# Patient Record
Sex: FEMALE | Race: WHITE | NOT HISPANIC OR LATINO | Employment: OTHER | URBAN - METROPOLITAN AREA
[De-identification: names, ages, dates, MRNs, and addresses within clinical notes are randomized per-mention and may not be internally consistent; named-entity substitution may affect disease eponyms.]

---

## 2018-06-21 ENCOUNTER — OFFICE VISIT (OUTPATIENT)
Dept: URGENT CARE | Facility: URGENT CARE | Age: 78
End: 2018-06-21
Payer: COMMERCIAL

## 2018-06-21 VITALS
SYSTOLIC BLOOD PRESSURE: 131 MMHG | HEART RATE: 57 BPM | TEMPERATURE: 99 F | DIASTOLIC BLOOD PRESSURE: 73 MMHG | WEIGHT: 175 LBS | OXYGEN SATURATION: 97 %

## 2018-06-21 DIAGNOSIS — H11.31 SUBCONJUNCTIVAL HEMORRHAGE OF RIGHT EYE: Primary | ICD-10-CM

## 2018-06-21 PROCEDURE — 99203 OFFICE O/P NEW LOW 30 MIN: CPT | Performed by: FAMILY MEDICINE

## 2018-06-21 NOTE — MR AVS SNAPSHOT
After Visit Summary   6/21/2018    Marcell Malone    MRN: [id number]           Patient Information     Date Of Birth          1940        Visit Information        Provider Department      6/21/2018 7:20 PM Pricila Angel MD [facility]        Today's Diagnoses     Subconjunctival hemorrhage of right eye    -  1      Care Instructions      Subconjunctival Hemorrhage    A subconjunctival hemorrhage is when a blood vessel breaks open in the white of the eye. It causes a bright red patch in the white of the eye. It is similar to a bruise on the skin. This type of hemorrhage is common. It can look quite alarming, but it is usually harmless.  Understanding the conjunctiva  The conjunctiva is the thin layer that covers the inside of the eyelids and the surface of the eye. It has many tiny blood vessels that bring oxygen and nutrients to the eye. The sclera is the white part of the eye that lies beneath the conjunctiva. Sometimes a blood vessel in the conjunctiva breaks open and bleeds. The blood then collects under the conjunctiva and turns part of the eye red. Over several weeks, your body then absorbs the blood.  What causes subconjunctival hemorrhage?  In many cases the cause isn t known. But some health conditions may make it more likely. These include:    Eye injury    Eye surgery    High blood pressure    Inflammation of the conjunctiva    Contact lens use    Diabetes    Arteriosclerosis    Tumor of the conjunctiva    Diseases that affect blood clotting    Violent sneezing, coughing, or vomiting    Certain medicines that can increase bleeding, such as aspirin    Pushing hard during childbirth    Straining during constipation  Symptoms of subconjunctival hemorrhage  The main symptom is a red patch on the eye. You may notice it after waking up in the morning. In most cases just one eye will have a hemorrhage. It usually happens once and then goes away. But some  health conditions may cause repeat hemorrhages. You may feel like you have something in your eye, but this is not common. The hemorrhage shouldn t affect your eyesight or cause any pain. If you do have pain, you may have another type of problem with your eye.  Diagnosing subconjunctival hemorrhage  Your healthcare provider will ask about your health history. You may have a physical exam. This includes a basic eye exam. Your provider will make sure you don t have other causes of red eye that may need other treatment.  You will need to see an eye doctor (ophthalmologist) if you have had an eye injury. This doctor might use a special lighted microscope to look closely at your eye. This helps show the doctor if the injury hurt the eye itself and not just its outer layer.  If this is not your first subconjunctival hemorrhage, your doctor may need to find the cause. For example, you may need blood tests to check for a blood clotting disorder.  Treatment for subconjunctival hemorrhage  In most cases you will not need treatment. The red patch will usually go away on its own in a few weeks. It will turn from red to brown and then yellow. There are no treatments to speed up this process. Your doctor may suggest you use a warm compress and artificial tears eye drops to help relieve some of the redness.  If your subconjunctival hemorrhage was caused by a health condition, that condition will be treated. For example, you may need a blood pressure medicine to treat high blood pressure.  When to call your healthcare provider  Call your healthcare provider right away if you have any of these:    Hemorrhage that doesn t go away in 2 to 3 weeks    Eye pain    Loss of eyesight    Another subconjunctival hemorrhage    Date Last Reviewed: 2/1/2017 2000-2017 [employer]. [address] [place] [address], [place] [address]. All rights reserved. This information is not intended as a substitute for professional medical care. Always  follow your healthcare professional's instructions.                Follow-ups after your visit        Follow-up notes from your care team     Return if symptoms worsen or fail to improve.      Who to contact     If you have questions or need follow up information about today's clinic visit or your schedule please contact [facility] directly at [phone].  Normal or non-critical lab and imaging results will be communicated to you by MyChart, letter or phone within 4 business days after the clinic has received the results. If you do not hear from us within 7 days, please contact the clinic through MyChart or phone. If you have a critical or abnormal lab result, we will notify you by phone as soon as possible.  Submit refill requests through [employer] or call your pharmacy and they will forward the refill request to us. Please allow 3 business days for your refill to be completed.          Additional Information About Your Visit        Care EveryWhere ID     This is your Care EveryWhere ID. This could be used by other organizations to access your [place] medical records  [id number]        Your Vitals Were     Pulse Temperature Pulse Oximetry             57 99  F (37.2  C) (Tympanic) 97%          Blood Pressure from Last 3 Encounters:   06/21/18 131/73    Weight from Last 3 Encounters:   06/21/18 175 lb (79.4 kg)              Today, you had the following     No orders found for display       Primary Care Provider Fax #    Provider Not In System [phone]                Equal Access to Services     SANFORD GOTTI : Hadii scotty ku hadasho Soomaali, waaxda luqadaha, qaybta kaalmada adejay jayda, дмитрий tenorio. So [facility] [phone].    ATENCIÓN: Si habla español, tiene a guzámn disposición servicios gratuitos de asistencia lingüística. Elroy chavez [phone].    We comply with applicable federal civil rights laws and [place] laws. We do not discriminate on the basis of race,  color, national origin, age, disability, sex, sexual orientation, or gender identity.            Thank you!     Thank you for choosing [facility]  for your care. Our goal is always to provide you with excellent care. Hearing back from our patients is one way we can continue to improve our services. Please take a few minutes to complete the written survey that you may receive in the mail after your visit with us. Thank you!             Your Updated Medication List - Protect others around you: Learn how to safely use, store and throw away your medicines at www.disposemymeds.org.          This list is accurate as of 6/21/18  7:54 PM.  Always use your most recent med list.                   Brand Name Dispense Instructions for use Diagnosis    BENADRYL PO

## 2018-06-22 NOTE — PROGRESS NOTES
SUBJECTIVE:   78 year old female with bloodshot right eye noticed about 2.5 hours ago.  Normal vision.  Feels right eye stinging sensation.  No other symptoms.  No significant prior ophthalmological history. No change in visual acuity, no photophobia, no severe eye pain.  She has been lifting a few things at home.  Denies eye trauma.  Does not take ASA nor Ibuprofen.     OBJECTIVE:  /73  Pulse 57  Temp 99  F (37.2  C) (Tympanic)  Wt 175 lb (79.4 kg)  SpO2 97%   Patient appears well, vitals signs are normal. Eyes: right eye with findings of typical subconjunctival hemorrhage from 3 to 9 o'clock.  No discharge. PERRL, EOMI, no foreign body noted. No periorbital cellulitis. The corneas are clear.      ASSESSMENT:   Right subconjunctival hemorrhage    PLAN:   Reassured.  Avoid ASA and Ibuprofen for now.  Avoid heavy lifting.  RTC if not improving as anticipated.     Pricila Gomez MD

## 2018-06-22 NOTE — PATIENT INSTRUCTIONS
Subconjunctival Hemorrhage    A subconjunctival hemorrhage is when a blood vessel breaks open in the white of the eye. It causes a bright red patch in the white of the eye. It is similar to a bruise on the skin. This type of hemorrhage is common. It can look quite alarming, but it is usually harmless.  Understanding the conjunctiva  The conjunctiva is the thin layer that covers the inside of the eyelids and the surface of the eye. It has many tiny blood vessels that bring oxygen and nutrients to the eye. The sclera is the white part of the eye that lies beneath the conjunctiva. Sometimes a blood vessel in the conjunctiva breaks open and bleeds. The blood then collects under the conjunctiva and turns part of the eye red. Over several weeks, your body then absorbs the blood.  What causes subconjunctival hemorrhage?  In many cases the cause isn t known. But some health conditions may make it more likely. These include:    Eye injury    Eye surgery    High blood pressure    Inflammation of the conjunctiva    Contact lens use    Diabetes    Arteriosclerosis    Tumor of the conjunctiva    Diseases that affect blood clotting    Violent sneezing, coughing, or vomiting    Certain medicines that can increase bleeding, such as aspirin    Pushing hard during childbirth    Straining during constipation  Symptoms of subconjunctival hemorrhage  The main symptom is a red patch on the eye. You may notice it after waking up in the morning. In most cases just one eye will have a hemorrhage. It usually happens once and then goes away. But some health conditions may cause repeat hemorrhages. You may feel like you have something in your eye, but this is not common. The hemorrhage shouldn t affect your eyesight or cause any pain. If you do have pain, you may have another type of problem with your eye.  Diagnosing subconjunctival hemorrhage  Your healthcare provider will ask about your health history. You may have a physical exam. This  includes a basic eye exam. Your provider will make sure you don t have other causes of red eye that may need other treatment.  You will need to see an eye doctor (ophthalmologist) if you have had an eye injury. This doctor might use a special lighted microscope to look closely at your eye. This helps show the doctor if the injury hurt the eye itself and not just its outer layer.  If this is not your first subconjunctival hemorrhage, your doctor may need to find the cause. For example, you may need blood tests to check for a blood clotting disorder.  Treatment for subconjunctival hemorrhage  In most cases you will not need treatment. The red patch will usually go away on its own in a few weeks. It will turn from red to brown and then yellow. There are no treatments to speed up this process. Your doctor may suggest you use a warm compress and artificial tears eye drops to help relieve some of the redness.  If your subconjunctival hemorrhage was caused by a health condition, that condition will be treated. For example, you may need a blood pressure medicine to treat high blood pressure.  When to call your healthcare provider  Call your healthcare provider right away if you have any of these:    Hemorrhage that doesn t go away in 2 to 3 weeks    Eye pain    Loss of eyesight    Another subconjunctival hemorrhage    Date Last Reviewed: 2/1/2017 2000-2017 [employer]. [address] [place] [address], [place] [address]. All rights reserved. This information is not intended as a substitute for professional medical care. Always follow your healthcare professional's instructions.

## 2019-04-23 ENCOUNTER — ANCILLARY PROCEDURE (OUTPATIENT)
Dept: GENERAL RADIOLOGY | Facility: CLINIC | Age: 79
End: 2019-04-23
Attending: FAMILY MEDICINE
Payer: MEDICARE

## 2019-04-23 ENCOUNTER — OFFICE VISIT (OUTPATIENT)
Dept: URGENT CARE | Facility: URGENT CARE | Age: 79
End: 2019-04-23
Payer: MEDICARE

## 2019-04-23 VITALS
OXYGEN SATURATION: 98 % | DIASTOLIC BLOOD PRESSURE: 68 MMHG | WEIGHT: 172 LBS | TEMPERATURE: 97.8 F | HEART RATE: 63 BPM | SYSTOLIC BLOOD PRESSURE: 135 MMHG

## 2019-04-23 DIAGNOSIS — S69.91XA HAND INJURY, RIGHT, INITIAL ENCOUNTER: ICD-10-CM

## 2019-04-23 DIAGNOSIS — S62.354A CLOSED NONDISPLACED FRACTURE OF SHAFT OF FOURTH METACARPAL BONE OF RIGHT HAND, INITIAL ENCOUNTER: Primary | ICD-10-CM

## 2019-04-23 DIAGNOSIS — S09.90XA HEAD INJURY, INITIAL ENCOUNTER: ICD-10-CM

## 2019-04-23 PROCEDURE — 73130 X-RAY EXAM OF HAND: CPT | Mod: RT | Performed by: FAMILY MEDICINE

## 2019-04-23 PROCEDURE — 99214 OFFICE O/P EST MOD 30 MIN: CPT | Performed by: FAMILY MEDICINE

## 2019-04-23 RX ORDER — FUROSEMIDE 20 MG
TABLET ORAL
Refills: 3 | COMMUNITY
Start: 2019-04-04

## 2019-04-24 NOTE — PROGRESS NOTES
SUBJECTIVE:   Marcell Malone is a 79 year old female presenting with multiple complaints related to a fall this afternoon.    Chief Complaint   Patient presents with     Urgent Care     Head Injury     c/o head injury     (1)  She tripped on an uneven sidewalk, landing on her outstretched right hand and hitting her left forehead.  No LOC, no confusions, dizziness, vision or speech difficulties, no c/o light sensitivity, no c/o headache or pain with eye movement.   Not on any blood thinners.  No bleeding from the site, but an abrasion is present along with some swelling at the left eyebrow.  No bruising.  She has not cleaned the wound or done anything for symptoms as of yet.    (2) right hand - bruising and swelling to the posterior hand after the fall.  Sore.  Hurts to move the fingers, but able to do so.  No numbness or tingling.         ROS:  5-Point Review of Systems Negative-- Except as stated above.    OBJECTIVE  /68   Pulse 63   Temp 97.8  F (36.6  C) (Oral)   Wt 78 kg (172 lb)   SpO2 98%   GENERAL:  Awake, alert and interactive. No acute distress.  HEAD:   PERRL, no discomfort with eye movement, EOMI, clear conjunctiva, eyelids appear benign.  Nose clear.  Mild puffiness to the middle - lateral left eyebrow and a vertical ~ 1 cm abrasion midline above the brow.  No erythema, no bruising.    HAND:  Right hand with arthritic and age related changes evident.  Bruising and swelling over the 3rd - 5th MCP joint.  Some tenderness to palpation this area also.   No TTP over fingers, rest of hand, or wrist.   Skin intact on hand.  No compromise to distal circulation.    XRAY HAND: shaft fracture to the proximal metacarpal, right hand, 4th digit.   Diffuse degenerative changes.      ASSESSMENT/PLAN    ICD-10-CM    1. Closed nondisplaced fracture of shaft of fourth metacarpal bone of right hand, initial encounter S62.354A    2. Head injury, initial encounter S09.90XA    3. Hand injury, right, initial encounter  S69.91XA XR Hand Right G/E 3 Views       We discussed the expected course healing, possibility of scarring on the forehead and s/s of head injury that would indicate a need for further evaluation right away, and symptomatic cares in detail.    Brace on the finger,  Tylenol or nsaids, f/u with PCP in ~ 1 week.      Patient Instructions   Tylenol or ibuprofen, ice packs for discomfort as needed for both the forehead and the hand.   Antibiotic ointment to the abrasion on your forehead until it scabs over, then leave open to air.       If any bad headaches, vomiting, speech difficulty, changes in sleep, or any of the other warning signs we discussed (and see the information on head injury below) develop -- return to care for further evaluation.     Wear the brace on your finger until your follow with your primary provider in about a week.  See your doctor right away if any new or worsening symptoms develop.           Patient Education     * Head Injury, no wake-up (Adult)    You have a head injury. It doesn t look serious right now. But symptoms of a more serious problem may appear later. This could be a mild brain injury (concussion), or bruising or bleeding in the brain. You or someone caring for you will need to watch for the symptoms listed below for at least the next 24 hours. When you get home, be sure to follow any care instructions you re given.  Home care  Watch for the following symptoms  Call 9-1-1 if you have any of these symptoms over the next hours or days:  1. Severe headache or headache that gets worse  2. Nausea and repeated throwing up (vomiting)  3. Dizziness or changes in eyesight (vision changes)  4. Bothered by bright light or loud noise  5. Sleep changes (trouble falling asleep or unusually sleepy or groggy)  6. Changes in the way you act or talk (personality or speech changes)  7. Feeling confused or forgetting things (memory loss)  8. Trouble walking or clumsiness  9. Passing out or fainting  (even for a short time)  10. Won t wake up  11. Stiff neck  12. Weakness or numbness in any part of the body  13. Seizures  Do you have signs of a concussion?  A concussion is an injury to the brain caused by shaking. If you were knocked out, that s a sign you may have a concussion. But watch for these signs, too:    Upset stomach (nausea)    Throwing up (vomiting)    Feeling dizzy or confused    Headache    Loss of memory  If you have any of the above signs:    Don t return to sports or any activity where you might hurt your head again.    Wait until all symptoms have been gone for 2 full weeks, and your doctor has cleared you to return to sports.  You could get a serious brain injury if you get hurt again before fully recovering.  General care    You don t need to stay awake or be woken during the night.    For pain, you may use:  ? Tylenol (acetaminophen) 650 to 1000 mg every 6 hours OR  ? Motrin or Advil (ibuprofen) 600 mg every 6 to 8 hours with food  NOTE: If you have chronic liver or kidney disease or ever had a stomach ulcer or GI bleeding, talk with your doctor before using these medicines.    Don t take aspirin after a head injury.    For swelling and to help with pain: Put a cold source to the injured area for up to 20 minutes at a time. Do this as often as directed. Use a cold pack or bag of ice wrapped in a thin towel. Never put a cold source directly on the skin.    For cuts and scrapes: Care for them as the doctor or nurse directed.    For the next 24 hours (or longer, if your doctor advises it):  ? Don t drink alcohol, use sedatives, or use other medicines that make you sleepy.  ? Don t drive or use large machines.  ? Don t do anything tiring, such as heavy lifting or straining.  ? Limit tasks where you need to focus or concentrate. This includes reading, using a smartphone or computer, watching TV and playing video games.  ? Don t return to sports or other activities that could cause another head  injury.  Follow-up care  Follow up with your doctor if symptoms don t get better after 24 hours, or as directed.  When to call the doctor  Call your doctor right away if any of these occur:    Pain doesn t get better or gets worse    New or increased swelling or bruising    Fever of 100.4 F (38 C) or higher, or as directed by your doctor    Increased redness, warmth, draining or bleeding from the injury    Fluid drainage or bleeding from the nose or ears    Any pushed-in spot or bony bump in the injured area  Date Last Reviewed: 9/26/2015 2000-2018 [employer]. [address] [place] [address]. All rights reserved. This information is not intended as a substitute for professional medical care. Always follow your healthcare professional's instructions.  This information has been modified by your health care provider with permission from the publisher.  Modifications clinically reviewed by Aston Mireles DO, MBA, LEXIS, Director of Physician Informatics for Emergency Medicine, [facility] on 8/27/18.

## 2019-04-24 NOTE — PATIENT INSTRUCTIONS
Tylenol or ibuprofen, ice packs for discomfort as needed for both the forehead and the hand.   Antibiotic ointment to the abrasion on your forehead until it scabs over, then leave open to air.       If any bad headaches, vomiting, speech difficulty, changes in sleep, or any of the other warning signs we discussed (and see the information on head injury below) develop -- return to care for further evaluation.     Wear the brace on your finger until your follow with your primary provider in about a week.  See your doctor right away if any new or worsening symptoms develop.           Patient Education     * Head Injury, no wake-up (Adult)    You have a head injury. It doesn t look serious right now. But symptoms of a more serious problem may appear later. This could be a mild brain injury (concussion), or bruising or bleeding in the brain. You or someone caring for you will need to watch for the symptoms listed below for at least the next 24 hours. When you get home, be sure to follow any care instructions you re given.  Home care  Watch for the following symptoms  Call 9-1-1 if you have any of these symptoms over the next hours or days:  1. Severe headache or headache that gets worse  2. Nausea and repeated throwing up (vomiting)  3. Dizziness or changes in eyesight (vision changes)  4. Bothered by bright light or loud noise  5. Sleep changes (trouble falling asleep or unusually sleepy or groggy)  6. Changes in the way you act or talk (personality or speech changes)  7. Feeling confused or forgetting things (memory loss)  8. Trouble walking or clumsiness  9. Passing out or fainting (even for a short time)  10. Won t wake up  11. Stiff neck  12. Weakness or numbness in any part of the body  13. Seizures  Do you have signs of a concussion?  A concussion is an injury to the brain caused by shaking. If you were knocked out, that s a sign you may have a concussion. But watch for these signs, too:    Upset stomach  (nausea)    Throwing up (vomiting)    Feeling dizzy or confused    Headache    Loss of memory  If you have any of the above signs:    Don t return to sports or any activity where you might hurt your head again.    Wait until all symptoms have been gone for 2 full weeks, and your doctor has cleared you to return to sports.  You could get a serious brain injury if you get hurt again before fully recovering.  General care    You don t need to stay awake or be woken during the night.    For pain, you may use:  ? Tylenol (acetaminophen) 650 to 1000 mg every 6 hours OR  ? Motrin or Advil (ibuprofen) 600 mg every 6 to 8 hours with food  NOTE: If you have chronic liver or kidney disease or ever had a stomach ulcer or GI bleeding, talk with your doctor before using these medicines.    Don t take aspirin after a head injury.    For swelling and to help with pain: Put a cold source to the injured area for up to 20 minutes at a time. Do this as often as directed. Use a cold pack or bag of ice wrapped in a thin towel. Never put a cold source directly on the skin.    For cuts and scrapes: Care for them as the doctor or nurse directed.    For the next 24 hours (or longer, if your doctor advises it):  ? Don t drink alcohol, use sedatives, or use other medicines that make you sleepy.  ? Don t drive or use large machines.  ? Don t do anything tiring, such as heavy lifting or straining.  ? Limit tasks where you need to focus or concentrate. This includes reading, using a smartphone or computer, watching TV and playing video games.  ? Don t return to sports or other activities that could cause another head injury.  Follow-up care  Follow up with your doctor if symptoms don t get better after 24 hours, or as directed.  When to call the doctor  Call your doctor right away if any of these occur:    Pain doesn t get better or gets worse    New or increased swelling or bruising    Fever of 100.4 F (38 C) or higher, or as directed by your  doctor    Increased redness, warmth, draining or bleeding from the injury    Fluid drainage or bleeding from the nose or ears    Any pushed-in spot or bony bump in the injured area  Date Last Reviewed: 9/26/2015 2000-2018 [employer] [address] [place] [address]. All rights reserved. This information is not intended as a substitute for professional medical care. Always follow your healthcare professional's instructions.  This information has been modified by your health care provider with permission from the publisher.  Modifications clinically reviewed by Aston Mireles DO, MBA, LEXIS, Director of Physician Informatics for Emergency Medicine, [facility] on 8/27/18.

## 2019-04-29 ENCOUNTER — OFFICE VISIT (OUTPATIENT)
Dept: FAMILY MEDICINE | Facility: CLINIC | Age: 79
End: 2019-04-29
Payer: MEDICARE

## 2019-04-29 VITALS
OXYGEN SATURATION: 98 % | DIASTOLIC BLOOD PRESSURE: 80 MMHG | HEIGHT: 65 IN | SYSTOLIC BLOOD PRESSURE: 130 MMHG | BODY MASS INDEX: 29.77 KG/M2 | RESPIRATION RATE: 16 BRPM | WEIGHT: 178.7 LBS | HEART RATE: 52 BPM | TEMPERATURE: 98.2 F

## 2019-04-29 DIAGNOSIS — S62.304D CLOSED NONDISPLACED FRACTURE OF FOURTH METACARPAL BONE OF RIGHT HAND WITH ROUTINE HEALING, UNSPECIFIED PORTION OF METACARPAL, SUBSEQUENT ENCOUNTER: ICD-10-CM

## 2019-04-29 DIAGNOSIS — Z01.818 PREOP GENERAL PHYSICAL EXAM: Primary | ICD-10-CM

## 2019-04-29 PROCEDURE — 36415 COLL VENOUS BLD VENIPUNCTURE: CPT | Performed by: PHYSICIAN ASSISTANT

## 2019-04-29 PROCEDURE — 80048 BASIC METABOLIC PNL TOTAL CA: CPT | Performed by: PHYSICIAN ASSISTANT

## 2019-04-29 PROCEDURE — 99214 OFFICE O/P EST MOD 30 MIN: CPT | Performed by: PHYSICIAN ASSISTANT

## 2019-04-29 ASSESSMENT — MIFFLIN-ST. JEOR: SCORE: 1272.49

## 2019-04-29 NOTE — PROGRESS NOTES
/[facility]  [address] [place]  [facility] [id number] [phone]  Dept: [phone]    PRE-OP EVALUATION:  Today's date: 2019    Marcell Malone (: 1940) presents for pre-operative evaluation assessment as requested by [facility].  She requires evaluation and anesthesia risk assessment prior to undergoing surgery/procedure for treatment of finger fracture .    Fax number for surgical facility: [phone]  Primary Physician: No Ref-Primary, Physician  Type of Anesthesia Anticipated: Local    Patient has a Health Care Directive or Living Will:  NO    Preop Questions 2019   Who is doing your surgery? [facility]   What are you having done? right hand ring finger   Date of Surgery/Procedure: 2019   Facility or Hospital where procedure/surgery will be performed: [facility]   1.  Do you have a history of Heart attack, stroke, stent, coronary bypass surgery, or other heart surgery? No   2.  Do you ever have any pain or discomfort in your chest? No   3.  Do you have a history of  Heart Failure? No   4.   Are you troubled by shortness of breath when:  walking on a level surface, or up a slight hill, or at night? No   5.  Do you currently have a cold, bronchitis or other respiratory infection? No   6.  Do you have a cough, shortness of breath, or wheezing? No   7.  Do you sometimes get pains in the calves of your legs when you walk? No   8. Do you or anyone in your family have previous history of blood clots? No   9.  Do you or does anyone in your family have a serious bleeding problem such as prolonged bleeding following surgeries or cuts? No   10. Have you ever had problems with anemia or been told to take iron pills? No   11. Have you had any abnormal blood loss such as black, tarry or bloody stools, or abnormal vaginal bleeding? No   12. Have you ever had a blood transfusion? No   13. Have you or any of your relatives ever had problems with anesthesia?  No   14. Do you have sleep apnea, excessive snoring or daytime drowsiness? No   15. Do you have any prosthetic heart valves? No   16. Do you have prosthetic joints? YES -    17. Is there any chance that you may be pregnant? 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  Marcell had a fall on 4/23 injuring her right hand/finger.  [facility] evaluation dx fracture 4th metacarpal bone.  She had follow up with ortho, and they are planning surgery tomorrow.  She did have knee replaced last year, and did have cardiac clearance done.  Did have normal MPI stress test done on May 2018.      Patient has had surgery in past and has never had any issues with anaesthesia, bleeding or blood clots.        See problem list for active medical problems.  Problems all longstanding and stable, except as noted/documented.  See ROS for pertinent symptoms related to these conditions.                                                                                                                                                          .    MEDICAL HISTORY:   There are no active problems to display for this patient.     No past medical history on file.  No past surgical history on file.  Current Outpatient Medications   Medication Sig Dispense Refill     DiphenhydrAMINE HCl (BENADRYL PO)        furosemide (LASIX) 20 MG tablet TK 1 T PO D  3     OTC products: None, except as noted above    No Known Allergies   Latex Allergy: NO    Social History     Tobacco Use     Smoking status: Never Smoker     Smokeless tobacco: Never Used   Substance Use Topics     Alcohol use: Not on file     History   Drug Use Not on file       REVIEW OF SYSTEMS:   CONSTITUTIONAL: NEGATIVE for fever, chills, change in weight  INTEGUMENTARY/SKIN: NEGATIVE for worrisome rashes, moles or lesions  EYES: NEGATIVE for vision changes or irritation  ENT/MOUTH: NEGATIVE for ear, mouth and throat problems  RESP: NEGATIVE for significant  "cough or SOB  CV: NEGATIVE for chest pain, palpitations or peripheral edema  GI: NEGATIVE for nausea, abdominal pain, heartburn, or change in bowel habits  : NEGATIVE for frequency, dysuria, or hematuria  MUSCULOSKELETAL:as above  NEURO: NEGATIVE for weakness, dizziness or paresthesias  ENDOCRINE: NEGATIVE for temperature intolerance, skin/hair changes  HEME: NEGATIVE for bleeding problems  PSYCHIATRIC: NEGATIVE for changes in mood or affect    EXAM:   /80   Pulse 52   Temp 98.2  F (36.8  C) (Oral)   Resp 16   Ht 1.645 m (5' 4.75\")   Wt 81.1 kg (178 lb 11.2 oz)   SpO2 98%   BMI 29.97 kg/m      GENERAL APPEARANCE: alert and active     EYES: EOMI, PERRL     HENT: ear canals and TM's normal and nose and mouth without ulcers or lesions     NECK: no adenopathy, no asymmetry, masses, or scars and thyroid normal to palpation     RESP: lungs clear to auscultation - no rales, rhonchi or wheezes     CV: regular rates and rhythm, normal S1 S2, no S3 or S4 and no murmur, click or rub     NEURO: Normal strength and tone, sensory exam grossly normal, mentation intact and speech normal     PSYCH: mentation appears normal. and affect normal/bright    DIAGNOSTICS:   EKG: normal MPI stress May 2018.    No results for input(s): HGB, PLT, INR, NA, POTASSIUM, CR, A1C in the last 75760 hours.     IMPRESSION:   Reason for surgery/procedure: metacarpal fracture  Diagnosis/reason for consult: as above    The proposed surgical procedure is considered INTERMEDIATE risk.    REVISED CARDIAC RISK INDEX  The patient has the following serious cardiovascular risks for perioperative complications such as (MI, PE, VFib and 3  AV Block):  No serious cardiac risks  INTERPRETATION: 0 risks: Class I (very low risk - 0.4% complication rate)    The patient has the following additional risks for perioperative complications:  No identified additional risks      ICD-10-CM    1. Preop general physical exam Z01.818 Basic metabolic panel   2. " Closed nondisplaced fracture of fourth metacarpal bone of right hand with routine healing, unspecified portion of metacarpal, subsequent encounter M86.567Q        RECOMMENDATIONS:       Cardiovascular Risk  Performs 4 METs exercise without symptoms (Climb a flight of stairs) .       --Patient is to take all scheduled medications on the day of surgery EXCEPT for modifications listed below.    APPROVAL GIVEN to proceed with proposed procedure, without further diagnostic evaluation       Signed Electronically by: Lee Harden PA-C    Copy of this evaluation report is provided to requesting physician.    [facility] Preop Guidelines    Revised Cardiac Risk Index

## 2019-04-30 LAB
ANION GAP SERPL CALCULATED.3IONS-SCNC: 7 MMOL/L (ref 3–14)
BUN SERPL-MCNC: 16 MG/DL (ref 7–30)
CALCIUM SERPL-MCNC: 8.9 MG/DL (ref 8.5–10.1)
CHLORIDE SERPL-SCNC: 107 MMOL/L (ref 94–109)
CO2 SERPL-SCNC: 25 MMOL/L (ref 20–32)
CREAT SERPL-MCNC: 0.87 MG/DL (ref 0.52–1.04)
GFR SERPL CREATININE-BSD FRML MDRD: 62 ML/MIN/{1.73_M2}
GLUCOSE SERPL-MCNC: 112 MG/DL (ref 70–99)
POTASSIUM SERPL-SCNC: 3.9 MMOL/L (ref 3.4–5.3)
SODIUM SERPL-SCNC: 139 MMOL/L (ref 133–144)

## 2022-01-26 ENCOUNTER — THERAPY VISIT (OUTPATIENT)
Dept: PHYSICAL THERAPY | Facility: CLINIC | Age: 82
End: 2022-01-26
Payer: MEDICARE

## 2022-01-26 DIAGNOSIS — M25.551 HIP PAIN, RIGHT: Primary | ICD-10-CM

## 2022-01-26 PROCEDURE — 97110 THERAPEUTIC EXERCISES: CPT | Mod: GP | Performed by: PHYSICAL THERAPIST

## 2022-01-26 PROCEDURE — 97161 PT EVAL LOW COMPLEX 20 MIN: CPT | Mod: GP | Performed by: PHYSICAL THERAPIST

## 2022-01-26 NOTE — PROGRESS NOTES
Physical Therapy Initial Evaluation: Subjective History  Date of Surgery: 12/17/2021.    Start of Care date: 1/26/2022  Surgical Procedure/Limb: s/p R ORIF   Surgeon Name: Dr. Berry   Precautions/Restrictions: WBAT    Average Daily Pain Levels: 2/10 (Location: incision; Quality: Aching/Throbbing and Tender)  Other Symptoms: R knee pain  Symptom Mgmt Strategies: movement, massage     Prior orthopaedic history/procedures: bilateral total knee replacements   Prior non-operative management: None, this was acute   Next MD Appt Date: 1/31/2022     Functional limitations following procedure: ambulation, stairs, transfers,   Previous level of function: no restrictions    Patient Employment: Retired artist   Desired Physical Activity (Goals): independent ambulation     Past medical history: osteoarthritis, osteoporosis, overweight   Medication: bone density     Post-Operative Physical Therapy Examination    Physical Mobility Status  Gait: ambulates with FWW with decreased step length L leg   Transfers:  Independent     Hip Joint ROM   Flex (120) Ext (30) ER (45) IR (45) ABD (45) ADD (30)   Right 90 deg 30 deg 30 deg 20 deg 35 deg 15 deg   Left 120 deg 10 deg 35 deg 20 deg 35 deg 15 deg   (*Indicates patient s pain)      Glute Muscle Activation Right Left   Isometric Glute Activation Fair and Delayed activation Normal and Good   Straight Leg Raising Unable to perform No extensor lag     Status of Incision: Clean & healing    Assessment/Plan  Patient is a 81 year old female with right side hip complaints.    Patient has the following significant findings with corresponding treatment plan.                Diagnosis 1:  s/p R ORIF   Pain -  hot/cold therapy, electric stimulation, manual therapy, splint/taping/bracing/orthotics, and self management  Decreased ROM/flexibility - manual therapy, therapeutic exercise, and home program  Decreased joint mobility - manual therapy and therapeutic exercise  Decreased strength - therapeutic  exercise, therapeutic activities, and home program  Impaired balance - neuro re-education and therapeutic activities  Decreased proprioception - neuro re-education and therapeutic activities  Edema - vasopneumatics, electric stimulation, cold therapy, cryocuff, and self management/home program  Impaired gait - gait training and assistive devices  Impaired muscle performance - neuro re-education  Decreased function - therapeutic activities    Previous and current functional limitations:  (See Goal Flow Sheet for this information)    Short term and Long term goals: (See Goal Flow Sheet for this information)     Communication ability:  Patient appears to be able to clearly communicate and understand verbal and written communication and follow directions correctly.  Treatment Explanation - The following has been discussed with the patient:   RX ordered/plan of care  Anticipated outcomes  Possible risks and side effects  This patient would benefit from PT intervention to resume normal activities.   Rehab potential is good.    Frequency:  1 X week, once daily  Duration:  for 10 weeks  Discharge Plan:  Achieve all LTG. Independent in home treatment program. Reach maximal therapeutic benefit.    Please refer to the daily flowsheet for treatment today, total treatment time and time spent performing 1:1 timed codes.

## 2022-01-27 NOTE — PROGRESS NOTES
[facility]    OUTPATIENT Physical Therapy ORTHOPEDIC EVALUATION  PLAN OF TREATMENT FOR OUTPATIENT REHABILITATION  (COMPLETE FOR INITIAL CLAIMS ONLY)  Patient's Last Name, First Name, M.I.  YOB: 1940  Marcell Malone    Provider s Name:  [facility]   Medical Record No.  [id number]   Start of Care Date:  01/26/22   Onset Date:   12/17/21   Type:     _X__PT   ___OT Medical Diagnosis:  No diagnosis found.     Treatment Diagnosis:  s/p R ORIF hip         Goals:     01/26/22 0500   Body Part   Goals listed below are for R hip    Goal #1   Goal #1 ambulation   Previous Functional Level No restrictions   Current Functional Level Minutes patient can walk;with walker   Performance Level step to gait pattern, 1/10 pain, 10 minutes   STG Target Performance Minutes patient will be able to walk   Performance Level 10 minutes, SPC, normal gait pattern    Rationale for safe community ambulation;to promote a healthy and active lifestyle   Due Date 02/16/22    LTG Target Performance Minutes patient will be able to  walk   Performance Level 30 minutes, no AD. 0/10 pain   Rationale for safe community ambulation;to promote a healthy and active lifestyle   Due Date 03/23/22       Therapy Frequency:  1x  Predicted Duration of Therapy Intervention:  10 weeks     Sachi Sosa, PT                 I CERTIFY THE NEED FOR THESE SERVICES FURNISHED UNDER        THIS PLAN OF TREATMENT AND WHILE UNDER MY CARE .             Physician Signature               Date    X_____________________________________________________                             Certification Date From:  01/26/22   Certification Date To:  04/16/22    Referring Provider:  Referred Self    Initial Assessment        See Epic Evaluation SOC Date: 01/26/22

## 2022-02-02 ENCOUNTER — THERAPY VISIT (OUTPATIENT)
Dept: PHYSICAL THERAPY | Facility: CLINIC | Age: 82
End: 2022-02-02
Payer: MEDICARE

## 2022-02-02 DIAGNOSIS — M25.551 HIP PAIN, RIGHT: ICD-10-CM

## 2022-02-02 PROCEDURE — 97110 THERAPEUTIC EXERCISES: CPT | Mod: GP | Performed by: PHYSICAL THERAPIST

## 2022-02-02 PROCEDURE — 97112 NEUROMUSCULAR REEDUCATION: CPT | Mod: GP | Performed by: PHYSICAL THERAPIST

## 2022-02-09 ENCOUNTER — THERAPY VISIT (OUTPATIENT)
Dept: PHYSICAL THERAPY | Facility: CLINIC | Age: 82
End: 2022-02-09
Payer: MEDICARE

## 2022-02-09 DIAGNOSIS — M25.551 HIP PAIN, RIGHT: ICD-10-CM

## 2022-02-09 PROCEDURE — 97110 THERAPEUTIC EXERCISES: CPT | Mod: GP | Performed by: PHYSICAL THERAPIST

## 2022-02-09 PROCEDURE — 97112 NEUROMUSCULAR REEDUCATION: CPT | Mod: GP | Performed by: PHYSICAL THERAPIST

## 2022-02-23 ENCOUNTER — THERAPY VISIT (OUTPATIENT)
Dept: PHYSICAL THERAPY | Facility: CLINIC | Age: 82
End: 2022-02-23
Payer: MEDICARE

## 2022-02-23 DIAGNOSIS — M25.551 HIP PAIN, RIGHT: ICD-10-CM

## 2022-02-23 PROCEDURE — 97112 NEUROMUSCULAR REEDUCATION: CPT | Mod: GP | Performed by: PHYSICAL THERAPIST

## 2022-02-23 PROCEDURE — 97110 THERAPEUTIC EXERCISES: CPT | Mod: GP | Performed by: PHYSICAL THERAPIST

## 2022-03-09 ENCOUNTER — THERAPY VISIT (OUTPATIENT)
Dept: PHYSICAL THERAPY | Facility: CLINIC | Age: 82
End: 2022-03-09
Payer: MEDICARE

## 2022-03-09 DIAGNOSIS — M25.551 HIP PAIN, RIGHT: ICD-10-CM

## 2022-03-09 PROCEDURE — 97110 THERAPEUTIC EXERCISES: CPT | Mod: GP | Performed by: PHYSICAL THERAPIST

## 2022-03-23 ENCOUNTER — THERAPY VISIT (OUTPATIENT)
Dept: PHYSICAL THERAPY | Facility: CLINIC | Age: 82
End: 2022-03-23
Payer: MEDICARE

## 2022-03-23 DIAGNOSIS — M25.551 HIP PAIN, RIGHT: ICD-10-CM

## 2022-03-23 PROCEDURE — 97110 THERAPEUTIC EXERCISES: CPT | Mod: GP | Performed by: PHYSICAL THERAPIST

## 2022-03-23 PROCEDURE — 97112 NEUROMUSCULAR REEDUCATION: CPT | Mod: GP | Performed by: PHYSICAL THERAPIST

## 2022-03-23 NOTE — PROGRESS NOTES
PROGRESS  REPORT    Progress reporting period is from 1/26/2022 to 3/23/2022.       SUBJECTIVE   Subjective: Patient states that if she walks for a prolonged period of time that her R hip does become sore, and she begins to limp. Overall, her appointment with Dr. Berry went well and only needs to check in on a prn basis. She states her biggest concern is her R foot. She feels this is often why she is tripping or falling     Current Pain level: 0/10.     Previous pain level was  6/10  .   Changes in function:  Yes (See Goal flowsheet attached for changes in current functional level)  Adverse reaction to treatment or activity: None    OBJECTIVE  Changes noted in objective findings:  Yes,   Objective: Ambulation: trendelenberg still present on R, evident with single leg activities as well      ASSESSMENT/PLAN  Updated problem list and treatment plan: Diagnosis 1:  S/p R TACOS  Pain -  manual therapy, splint/taping/bracing/orthotics, self management, education and home program  Decreased ROM/flexibility - manual therapy, therapeutic exercise, therapeutic activity and home program  Decreased joint mobility - manual therapy, therapeutic exercise, therapeutic activity and home program   Decreased strength - therapeutic exercise, therapeutic activities and home program  Decreased function - therapeutic activities and home program  STG/LTGs have been met or progress has been made towards goals:  Yes (See Goal flow sheet completed today.)  Assessment of Progress: The patient's condition is improving.  The patient's condition has potential to improve.  Self Management Plans:  Patient has been instructed in a home treatment program.  Patient  has been instructed in self management of symptoms.  I have re-evaluated this patient and find that the nature, scope, duration and intensity of the therapy is appropriate for the medical condition of the patient.  Marcell continues to require the following intervention to meet STG and LTG's:   PT    Recommendations:  This patient would benefit from continued therapy.     Frequency:  1 X week, once daily  Duration:  for 4 weeks        Please refer to the daily flowsheet for treatment today, total treatment time and time spent performing 1:1 timed codes.

## 2022-04-06 ENCOUNTER — THERAPY VISIT (OUTPATIENT)
Dept: PHYSICAL THERAPY | Facility: CLINIC | Age: 82
End: 2022-04-06
Payer: MEDICARE

## 2022-04-06 DIAGNOSIS — M25.551 HIP PAIN, RIGHT: ICD-10-CM

## 2022-04-06 PROCEDURE — 97112 NEUROMUSCULAR REEDUCATION: CPT | Mod: GP | Performed by: PHYSICAL THERAPIST

## 2022-04-06 PROCEDURE — 97110 THERAPEUTIC EXERCISES: CPT | Mod: GP | Performed by: PHYSICAL THERAPIST

## 2022-04-06 NOTE — PROGRESS NOTES
[facility]    OUTPATIENT Physical Therapy ORTHOPEDIC EVALUATION  PLAN OF TREATMENT FOR OUTPATIENT REHABILITATION  (COMPLETE FOR INITIAL CLAIMS ONLY)  Patient's Last Name, First Name, M.I.  YOB: 1940  Marcell Malone s Name:  [facility]   Medical Record No.  [id number]   Start of Care Date:  01/26/22   Onset Date:   12/17/21   Type:     _X__PT   ___OT Medical Diagnosis:    Encounter Diagnosis   Name Primary?     Hip pain, right         Treatment Diagnosis:  s/p R ORIF hip         Goals:     04/06/22 0500   Body Part   Goals listed below are for R hip    Goal #1   Goal #1 ambulation   Previous Functional Level No restrictions   Current Functional Level Minutes patient can walk;with walker   Performance Level step to gait pattern, 1/10 pain, 10 minutes   STG Target Performance Minutes patient will be able to walk   Performance Level 10 minutes, SPC, normal gait pattern    Rationale for safe community ambulation;to promote a healthy and active lifestyle   Due Date 02/16/22   Date Goal Met 02/09/22    LTG Target Performance Minutes patient will be able to  walk   Performance Level 30 minutes, no AD. 0/10 pain   Rationale for safe community ambulation;to promote a healthy and active lifestyle   Due Date 03/23/22   Date Goal Met 03/09/22   Goal #2   Goal #2 stairs   Previous Functional Level No restrictions   Current Functional Level Ascend/descend stairs;in a normal reciprocal pattern;with a railing   Performance Level with 6/10 pain   STG Target Performance Ascend/descend stairs;in a normal reciprocal pattern;with a railing   Performance Level with 3/10 pain   Rationale to reach upper level of home safely;to reach lower level of home safely;for safe community ambulaton   Due Date 03/30/22   Date Goal Met 04/06/22   LTG Target Performance Ascend/descend  stairs;in a normal reciprocal pattern;with railing   Performance Level 1/10 pain    Rationale to reach upper level of home safely;to reach lower level of home safely;for safe community ambulaton   Due Date 06/01/22       Therapy Frequency:  1x/month   Predicted Duration of Therapy Intervention:  3 months    Sachi Sosa, PT                 I CERTIFY THE NEED FOR THESE SERVICES FURNISHED UNDER        THIS PLAN OF TREATMENT AND WHILE UNDER MY CARE     (Physician attestation of this document indicates review and certification of the therapy plan).                       Certification Date From:  04/06/22   Certification Date To:  06/25/22    Referring Provider:  Referred Self    Initial Assessment        See Epic Evaluation SOC Date: 01/26/22

## 2022-04-19 NOTE — PROGRESS NOTES
[facility]    OUTPATIENT Physical Therapy ORTHOPEDIC EVALUATION  PLAN OF TREATMENT FOR OUTPATIENT REHABILITATION  (COMPLETE FOR INITIAL CLAIMS ONLY)  Patient's Last Name, First Name, M.I.  YOB: 1940  Marcell Malone s Name:  [facility]   Medical Record No.  [id number]   Start of Care Date:  01/26/22   Onset Date:   12/17/21   Type:     _X__PT   ___OT Medical Diagnosis:    Encounter Diagnosis   Name Primary?    Hip pain, right         Treatment Diagnosis:  s/p R ORIF hip         Goals:     04/06/22 0500   Body Part   Goals listed below are for R hip    Goal #1   Goal #1 ambulation   Previous Functional Level No restrictions   Current Functional Level Minutes patient can walk;with walker   Performance Level step to gait pattern, 1/10 pain, 10 minutes   STG Target Performance Minutes patient will be able to walk   Performance Level 10 minutes, SPC, normal gait pattern    Rationale for safe community ambulation;to promote a healthy and active lifestyle   Due Date 02/16/22   Date Goal Met 02/09/22    LTG Target Performance Minutes patient will be able to  walk   Performance Level 30 minutes, no AD. 0/10 pain   Rationale for safe community ambulation;to promote a healthy and active lifestyle   Due Date 03/23/22   Date Goal Met 03/09/22   Goal #2   Goal #2 stairs   Previous Functional Level No restrictions   Current Functional Level Ascend/descend stairs;in a normal reciprocal pattern;with a railing   Performance Level with 6/10 pain   STG Target Performance Ascend/descend stairs;in a normal reciprocal pattern;with a railing   Performance Level with 3/10 pain   Rationale to reach upper level of home safely;to reach lower level of home safely;for safe community ambulaton   Due Date 03/30/22   Date Goal Met 04/06/22   LTG Target Performance Ascend/descend  stairs;in a normal reciprocal pattern;with railing   Performance Level 1/10 pain    Rationale to reach upper level of home safely;to reach lower level of home safely;for safe community ambulaton   Due Date 06/01/22       Therapy Frequency:  1x/month   Predicted Duration of Therapy Intervention:  3 months    Sachi Sosa, PT                 I CERTIFY THE NEED FOR THESE SERVICES FURNISHED UNDER        THIS PLAN OF TREATMENT AND WHILE UNDER MY CARE .             Physician Signature               Date    X_____________________________________________________                         Certification Date From:  04/06/22   Certification Date To:  06/25/22    Referring Provider:  Referred Self    Initial Assessment        See Epic Evaluation SOC Date: 01/26/22

## 2022-05-11 ENCOUNTER — THERAPY VISIT (OUTPATIENT)
Dept: PHYSICAL THERAPY | Facility: CLINIC | Age: 82
End: 2022-05-11
Payer: MEDICARE

## 2022-05-11 DIAGNOSIS — M25.551 HIP PAIN, RIGHT: Primary | ICD-10-CM

## 2022-05-11 PROCEDURE — 97110 THERAPEUTIC EXERCISES: CPT | Mod: GP | Performed by: PHYSICAL THERAPIST

## 2022-05-11 PROCEDURE — 97112 NEUROMUSCULAR REEDUCATION: CPT | Mod: GP | Performed by: PHYSICAL THERAPIST

## 2022-05-11 NOTE — PROGRESS NOTES
DISCHARGE REPORT    Progress reporting period is from 1/26/2022 to 5/11/2022.       SUBJECTIVE  Subjective changes noted by patient: Subjective: patient states that her hip is doing better, but has noticed more ankle swelling, and overall fatigue that is limiting her. She has an appointment with her PCP the first week in June. She feels ready to discharge from PT with use of HEP    Current pain level is Current Pain level: 0/10.     Previous pain level was   .   Changes in function:  Yes (See Goal flowsheet attached for changes in current functional level)  Adverse reaction to treatment or activity: None    OBJECTIVE  Changes noted in objective findings:  Yes,   Objective: Trendelenburg mild on R     ASSESSMENT/PLAN  Updated problem list and treatment plan: Diagnosis 1:  S/p R ORIF  Pain -  home program  Decreased strength - home program  Decreased function - home program  STG/LTGs have been met or progress has been made towards goals:  Yes (See Goal flow sheet completed today.)  Assessment of Progress: The patient's condition is improving.  The patient's condition has potential to improve.  The patient has met all of their long term goals.  Self Management Plans:  Patient has been instructed in a home treatment program.  Patient is independent in a home treatment program.  Patient  has been instructed in self management of symptoms.  Patient is independent in self management of symptoms.  I have re-evaluated this patient and find that the nature, scope, duration and intensity of the therapy is appropriate for the medical condition of the patient.  Marcell continues to require the following intervention to meet STG and LTG's:  PT intervention is no longer required to meet STG/LTG.    Recommendations:  This patient is ready to be discharged from therapy and continue their home treatment program. Patient to follow up with PT or MD as needed    Please refer to the daily flowsheet for treatment today, total treatment  time and time spent performing 1:1 timed codes.

## 2023-11-20 ENCOUNTER — THERAPY VISIT (OUTPATIENT)
Dept: PHYSICAL THERAPY | Facility: CLINIC | Age: 83
End: 2023-11-20
Payer: MEDICARE

## 2023-11-20 DIAGNOSIS — M25.551 HIP PAIN, RIGHT: Primary | ICD-10-CM

## 2023-11-20 PROCEDURE — 97161 PT EVAL LOW COMPLEX 20 MIN: CPT | Mod: GP | Performed by: PHYSICAL THERAPIST

## 2023-11-20 PROCEDURE — 97110 THERAPEUTIC EXERCISES: CPT | Mod: GP | Performed by: PHYSICAL THERAPIST

## 2023-11-20 PROCEDURE — 97140 MANUAL THERAPY 1/> REGIONS: CPT | Mod: GP | Performed by: PHYSICAL THERAPIST

## 2023-11-20 ASSESSMENT — ACTIVITIES OF DAILY LIVING (ADL)
SITTING_FOR_15_MINUTES: NO DIFFICULTY AT ALL
WALKING_UP_STEEP_HILLS: SLIGHT DIFFICULTY
HEAVY_WORK: SLIGHT DIFFICULTY
DEEP_SQUATTING: MODERATE DIFFICULTY
STEPPING_UP_AND_DOWN_CURBS: NO DIFFICULTY AT ALL
HOS_ADL_ITEM_SCORE_TOTAL: 48
RECREATIONAL_ACTIVITIES: MODERATE DIFFICULTY
STANDING_FOR_15_MINUTES: NO DIFFICULTY AT ALL
GOING_DOWN_1_FLIGHT_OF_STAIRS: SLIGHT DIFFICULTY
LIGHT_TO_MODERATE_WORK: NO DIFFICULTY AT ALL
GOING_UP_1_FLIGHT_OF_STAIRS: SLIGHT DIFFICULTY
WALKING_APPROXIMATELY_10_MINUTES: SLIGHT DIFFICULTY
HOS_ADL_HIGHEST_POTENTIAL_SCORE: 68
GETTING_INTO_AND_OUT_OF_A_BATHTUB: UNABLE TO DO
WALKING_INITIALLY: NO DIFFICULTY AT ALL
GETTING_INTO_AND_OUT_OF_AN_AVERAGE_CAR: SLIGHT DIFFICULTY
HOS_ADL_SCORE(%): 70.59
ROLLING_OVER_IN_BED: SLIGHT DIFFICULTY
PUTTING_ON_SOCKS_AND_SHOES: NO DIFFICULTY AT ALL
WALKING_DOWN_STEEP_HILLS: EXTREME DIFFICULTY
HOW_WOULD_YOU_RATE_YOUR_CURRENT_LEVEL_OF_FUNCTION_DURING_YOUR_USUAL_ACTIVITIES_OF_DAILY_LIVING_FROM_0_TO_100_WITH_100_BEING_YOUR_LEVEL_OF_FUNCTION_PRIOR_TO_YOUR_HIP_PROBLEM_AND_0_BEING_THE_INABILITY_TO_PERFORM_ANY_OF_YOUR_USUAL_DAILY_ACTIVITIES?: 85
HOS_ADL_COUNT: 17
TWISTING/PIVOTING_ON_INVOLVED_LEG: NO DIFFICULTY AT ALL
WALKING_15_MINUTES_OR_GREATER: MODERATE DIFFICULTY

## 2023-11-20 NOTE — PROGRESS NOTES
PHYSICAL THERAPY EVALUATION  Type of Visit: Evaluation    See electronic medical record for Abuse and Falls Screening details.     Has felt pain off and on for past year in R lateral hip. Xray cleared from Dr. Crouch. Sleeping on right hip is very pain. Lateral aspect of hip along GT and ITB. Also painful on R glute med area after sleeping for awhile. Hurts to get up and out of bed. Sometimes has to sleep in recliner is able to sleep without problem. Feels an ache in ITB throughout the day, and doesn't feel even compared to L. She is feeling exhausted after walk. If she walks quickly she will feel very tired. Did go to a PT at [facility] this past spring for the R hip without much help (trialed US 3 times without help).      Subjective       Presenting condition or subjective complaint: right hip pain  Date of onset: 10/30/23 (MD order)    Relevant medical history: Hearing problems; Osteoporosis; Pain at night or rest   Dates & types of surgery:      Prior diagnostic imaging/testing results: X-ray     Prior therapy history for the same diagnosis, illness or injury: Yes Right hip fracture    Prior Level of Function  Transfers: Independent  Ambulation: Independent  ADL: Independent  IADL: Finances, Housekeeping, Laundry, Meal preparation    Living Environment  Social support: Alone   Type of home: Apartment/condo   Stairs to enter the home: No   Is there a railing: No   Ramp:     Stairs inside the home: No       Help at home: None  Equipment owned:       Employment: No    Hobbies/Interests: reading, creating art, writing, exercise, theater, concerts, galleries, getting together with friends and family    Patient goals for therapy: walk, sleep    Pain assessment: Location: R lateral hip along GT and ITB, R glute/Rating: 3/10     Objective   HIP EVALUATION  PAIN: Pain is Exacerbated By: walking, sleeping  Pain is Relieved By: rest      GAIT:   Weightbearing Status: WBAT  Assistive Device(s): None  Gait Deviations:  Trendelenberg R    ROM:   (Degrees) Left AROM Left PROM  Right AROM Right PROM   Hip Flexion 110  100, no pain     Hip Extension 5  0    Hip Internal Rotation 15  10    Hip External Rotation 30  25, + pain     Lumbar Side glide Nil loss Nil loss   Lumbar Flexion Nil loss   Lumbar Extension Min loss + stretch in R glute    Pain:   End feel:     PELVIC/SI SCREEN: Sacroiliac Provocation Test: - thigh thrust, - fader  STRENGTH:   Pain: - none + mild ++ moderate +++ severe  Strength Scale: 0-5/5 Left Right   Hip Flexion 5 5   Hip Extension 5- 4, + (mild)   Hip Abduction 5- 4-, ++ (mod)   Hip Adduction 5 5-   Hip Internal Rotation 5 5   Hip External Rotation 5 4   Knee Flexion 5 5   Knee Extension 5 5     LE FLEXIBILITY:  decreased glute med R  SPECIAL TESTS:    Left Right   JUANY Negative  Positive   FADIR/Labrum/VEE Negative  Positive   Femoral Nerve Negative  Negative    Claude's Negative  Positive   Piriformis Negative  Positive   Quadrant Testing Negative  Negative    SLR Negative  Negative    Slump Negative  Negative    Stork with Extension Negative  Negative    Humberto Negative  Negative      FUNCTIONAL TESTS: Double Leg Squat: Anterior knee translation, Knee valgus, Hip internal rotation, and Improper use of glutes/hips  SLS: unable to balance on R leg without assistance  PALPATION:  TTP along R glute med and R piriformis   JOINT MOBILITY: NT     Assessment & Plan   CLINICAL IMPRESSIONS  Medical Diagnosis: trochanteric bursitis of right hip, muscle strain    Treatment Diagnosis: R glute med tedninopathy   Impression/Assessment: Patient is a 83 year old female with Right hip complaints.  The following significant findings have been identified: Pain, Decreased ROM/flexibility, Decreased joint mobility, Decreased strength, Impaired balance, and Decreased proprioception. These impairments interfere with their ability to perform self care tasks, recreational activities, and household chores as compared to previous level  of function.     Clinical Decision Making (Complexity):  Clinical Presentation: Stable/Uncomplicated  Clinical Presentation Rationale: based on medical and personal factors listed in PT evaluation  Clinical Decision Making (Complexity): Low complexity    PLAN OF CARE  Treatment Interventions:  Modalities: Cryotherapy, Dry Needling  Interventions: Gait Training, Manual Therapy, Neuromuscular Re-education, Therapeutic Activity, Therapeutic Exercise, Self-Care/Home Management    Long Term Goals     PT Goal 1  Goal Identifier: Walking  Goal Description: Patient will ambulate 1 mile on even ground with 1/10 pain  Rationale: to maximize safety and independence with performance of ADLs and functional tasks;to maximize safety and independence within the community  Target Date: 02/12/24      Frequency of Treatment: 1x per week 4 weeks, 2x per month 2 months  Duration of Treatment: 84 days    Recommended Referrals to Other Professionals:  none  Education Assessment:        Risks and benefits of evaluation/treatment have been explained.   Patient/Family/caregiver agrees with Plan of Care.     Evaluation Time:     PT Eval, Low Complexity Minutes (23294): 20     Signing Clinician: Sachi Sosa, PT      [facility]                                                                                   OUTPATIENT PHYSICAL THERAPY      PLAN OF TREATMENT FOR OUTPATIENT REHABILITATION   Patient's Last Name, First Name, Marcell Glez YOB: 1940   Provider's Name   [facility]   Medical Record No.  [id number]     Onset Date: 10/30/23 (MD order)  Start of Care Date: 11/20/23     Medical Diagnosis:  trochanteric bursitis of right hip, muscle strain      PT Treatment Diagnosis:  R glute med tedninopathy Plan of Treatment  Frequency/Duration: 1x per week 4 weeks, 2x per month 2 months/ 84 days    Certification date from 11/20/23 to 02/12/24         See note  for plan of treatment details and functional goals     Sachi Sosa, PT                         I CERTIFY THE NEED FOR THESE SERVICES FURNISHED UNDER        THIS PLAN OF TREATMENT AND WHILE UNDER MY CARE .             Physician Signature               Date    X_____________________________________________________                  Referring Provider:  Smith Crouch    Initial Assessment  See Epic Evaluation- Start of Care Date: 11/20/23

## 2023-12-05 ENCOUNTER — THERAPY VISIT (OUTPATIENT)
Dept: PHYSICAL THERAPY | Facility: CLINIC | Age: 83
End: 2023-12-05
Payer: MEDICARE

## 2023-12-05 DIAGNOSIS — M25.551 HIP PAIN, RIGHT: Primary | ICD-10-CM

## 2023-12-05 PROCEDURE — 97110 THERAPEUTIC EXERCISES: CPT | Mod: GP | Performed by: PHYSICAL THERAPIST

## 2023-12-05 PROCEDURE — 97140 MANUAL THERAPY 1/> REGIONS: CPT | Mod: GP | Performed by: PHYSICAL THERAPIST

## 2023-12-12 ENCOUNTER — THERAPY VISIT (OUTPATIENT)
Dept: PHYSICAL THERAPY | Facility: CLINIC | Age: 83
End: 2023-12-12
Payer: MEDICARE

## 2023-12-12 DIAGNOSIS — M25.551 HIP PAIN, RIGHT: Primary | ICD-10-CM

## 2023-12-12 PROCEDURE — 97110 THERAPEUTIC EXERCISES: CPT | Mod: GP | Performed by: PHYSICAL THERAPIST

## 2023-12-12 PROCEDURE — 97140 MANUAL THERAPY 1/> REGIONS: CPT | Mod: GP | Performed by: PHYSICAL THERAPIST

## 2024-01-02 ENCOUNTER — THERAPY VISIT (OUTPATIENT)
Dept: PHYSICAL THERAPY | Facility: CLINIC | Age: 84
End: 2024-01-02
Payer: MEDICARE

## 2024-01-02 DIAGNOSIS — M25.551 HIP PAIN, RIGHT: Primary | ICD-10-CM

## 2024-01-02 PROCEDURE — 97110 THERAPEUTIC EXERCISES: CPT | Mod: GP | Performed by: PHYSICAL THERAPIST

## 2024-01-02 PROCEDURE — 97140 MANUAL THERAPY 1/> REGIONS: CPT | Mod: GP | Performed by: PHYSICAL THERAPIST

## 2024-01-09 ENCOUNTER — THERAPY VISIT (OUTPATIENT)
Dept: PHYSICAL THERAPY | Facility: CLINIC | Age: 84
End: 2024-01-09
Payer: MEDICARE

## 2024-01-09 DIAGNOSIS — M25.551 HIP PAIN, RIGHT: Primary | ICD-10-CM

## 2024-01-09 PROCEDURE — 97110 THERAPEUTIC EXERCISES: CPT | Mod: GP | Performed by: PHYSICAL THERAPIST

## 2024-01-16 ENCOUNTER — THERAPY VISIT (OUTPATIENT)
Dept: PHYSICAL THERAPY | Facility: CLINIC | Age: 84
End: 2024-01-16
Payer: MEDICARE

## 2024-01-16 DIAGNOSIS — M25.551 HIP PAIN, RIGHT: Primary | ICD-10-CM

## 2024-01-16 PROCEDURE — 97140 MANUAL THERAPY 1/> REGIONS: CPT | Mod: GP | Performed by: PHYSICAL THERAPIST

## 2024-01-16 PROCEDURE — 97110 THERAPEUTIC EXERCISES: CPT | Mod: GP | Performed by: PHYSICAL THERAPIST

## 2024-01-23 ENCOUNTER — THERAPY VISIT (OUTPATIENT)
Dept: PHYSICAL THERAPY | Facility: CLINIC | Age: 84
End: 2024-01-23
Payer: MEDICARE

## 2024-01-23 DIAGNOSIS — M25.551 HIP PAIN, RIGHT: Primary | ICD-10-CM

## 2024-01-23 PROCEDURE — 97110 THERAPEUTIC EXERCISES: CPT | Mod: GP | Performed by: PHYSICAL THERAPIST

## 2024-01-31 ENCOUNTER — THERAPY VISIT (OUTPATIENT)
Dept: PHYSICAL THERAPY | Facility: CLINIC | Age: 84
End: 2024-01-31
Payer: MEDICARE

## 2024-01-31 DIAGNOSIS — M25.551 HIP PAIN, RIGHT: Primary | ICD-10-CM

## 2024-01-31 PROCEDURE — 97110 THERAPEUTIC EXERCISES: CPT | Mod: GP | Performed by: PHYSICAL THERAPIST

## 2024-02-22 ENCOUNTER — THERAPY VISIT (OUTPATIENT)
Dept: PHYSICAL THERAPY | Facility: CLINIC | Age: 84
End: 2024-02-22
Payer: MEDICARE

## 2024-02-22 DIAGNOSIS — M25.551 HIP PAIN, RIGHT: Primary | ICD-10-CM

## 2024-02-22 PROCEDURE — 97110 THERAPEUTIC EXERCISES: CPT | Mod: GP | Performed by: PHYSICAL THERAPIST

## 2024-02-22 PROCEDURE — 97530 THERAPEUTIC ACTIVITIES: CPT | Mod: GP | Performed by: PHYSICAL THERAPIST

## 2024-02-22 NOTE — PROGRESS NOTES
DISCHARGE  Reason for Discharge: Patient has met all goals.    Equipment Issued: HEP     Discharge Plan: Patient to continue home program.    Referring Provider:  Smith Crouch       02/22/24 0500   Appointment Info   Signing clinician's name / credentials Sachi Arnold DPT, OCS   Total/Authorized Visits 16 per MD   Visits Used 9   Medical Diagnosis trochanteric bursitis of right hip, muscle strain   PT Tx Diagnosis R glute med tedninopathy   Quick Adds Certification   Progress Note/Certification   Start of Care Date 11/20/23   Onset of illness/injury or Date of Surgery 10/30/23  (MD order)   Therapy Frequency 1x per week 4 weeks, 2x per month 2 months   Predicted Duration 84 days   Certification date from 11/20/23   Certification date to 02/12/24   PT Goal 1   Goal Identifier Walking   Goal Description Patient will ambulate 1 mile on even ground with 1/10 pain   Rationale to maximize safety and independence with performance of ADLs and functional tasks;to maximize safety and independence within the community   Target Date 02/12/24   Subjective Report   Subjective Report Marcell states that she was doing well for awhile, but last night slept on her RIght side and this caused an exacerbation of pain. She states that she had pain down the side of her leg to bursa, and into R low back. She states she is going to check in with Dr. Crouch again. She is going to discharge from PT today with use of HEP   Objective Measures   Objective Measures Objective Measure 1   Objective Measure 1   Objective Measure Ambulation   Details Compensated Trendelenberg on R   Treatment Interventions (PT)   Interventions Therapeutic Procedure/Exercise;Therapeutic Activity   Therapeutic Procedure/Exercise   Therapeutic Procedures: strength, endurance, ROM, flexibillity minutes (82406) 15   Therapeutic Procedures Ther Proc 2;Ther Proc 3;Ther Proc 4   Ther Proc 1 Lumbar roll   Ther Proc 1 - Details VC to go side to side   Ther Proc 2 SLR  standing   Ther Proc 2 - Details VC to go slow and controlleed   Ther Proc 4 Single leg stance   Ther Proc 4 - Details VC to keep hips level, and R hand on counter   Skilled Intervention increase glute engagement, and decrease senstivity of glute med   Therapeutic Activity   Therapeutic Activities: dynamic activities to improve functional performance minutes (70483) 15   Ther Act 1 Curves routine adjustment   Ther Act 1 - Details Education to decrease single leg movements with twisting, implement more glute max exercises. and alternatives to other exercises   Manual Therapy   Manual Therapy 1 Long axis distraction   Manual Therapy 1 - Details towel around ankle, long axis pull x 15 minutes   Skilled Intervention increase extensibilty of glute, decrease sensitivity